# Patient Record
Sex: FEMALE | Race: OTHER | HISPANIC OR LATINO | Employment: STUDENT | URBAN - METROPOLITAN AREA
[De-identification: names, ages, dates, MRNs, and addresses within clinical notes are randomized per-mention and may not be internally consistent; named-entity substitution may affect disease eponyms.]

---
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
## 2024-03-01 ENCOUNTER — APPOINTMENT (OUTPATIENT)
Dept: URGENT CARE | Facility: CLINIC | Age: 19
End: 2024-03-01
Payer: OTHER MISCELLANEOUS

## 2024-03-01 PROCEDURE — G0382 LEV 3 HOSP TYPE B ED VISIT: HCPCS

## 2024-03-01 PROCEDURE — 99283 EMERGENCY DEPT VISIT LOW MDM: CPT

## 2024-03-04 ENCOUNTER — APPOINTMENT (OUTPATIENT)
Dept: URGENT CARE | Facility: CLINIC | Age: 19
End: 2024-03-04
Payer: OTHER MISCELLANEOUS

## 2024-03-04 PROCEDURE — 99213 OFFICE O/P EST LOW 20 MIN: CPT

## 2024-07-30 ENCOUNTER — OFFICE VISIT (OUTPATIENT)
Dept: URGENT CARE | Facility: CLINIC | Age: 19
End: 2024-07-30
Payer: COMMERCIAL

## 2024-07-30 VITALS
WEIGHT: 138.2 LBS | TEMPERATURE: 98.8 F | RESPIRATION RATE: 18 BRPM | DIASTOLIC BLOOD PRESSURE: 64 MMHG | BODY MASS INDEX: 25.43 KG/M2 | SYSTOLIC BLOOD PRESSURE: 108 MMHG | HEART RATE: 65 BPM | OXYGEN SATURATION: 97 % | HEIGHT: 62 IN

## 2024-07-30 DIAGNOSIS — M54.50 ACUTE LEFT-SIDED LOW BACK PAIN WITHOUT SCIATICA: Primary | ICD-10-CM

## 2024-07-30 PROCEDURE — 99213 OFFICE O/P EST LOW 20 MIN: CPT | Performed by: NURSE PRACTITIONER

## 2024-07-30 RX ORDER — BACLOFEN 10 MG/1
10 TABLET ORAL 3 TIMES DAILY PRN
Qty: 30 TABLET | Refills: 0 | Status: SHIPPED | OUTPATIENT
Start: 2024-07-30

## 2024-07-30 RX ORDER — ESCITALOPRAM OXALATE 10 MG/1
10 TABLET ORAL DAILY
COMMUNITY
Start: 2024-06-14

## 2024-07-30 RX ORDER — IBUPROFEN 400 MG/1
400 TABLET ORAL 3 TIMES DAILY PRN
Qty: 60 TABLET | Refills: 0 | Status: SHIPPED | OUTPATIENT
Start: 2024-07-30

## 2024-07-30 NOTE — PROGRESS NOTES
[facility]        NAME: Mara Ruiz is a 18 y.o. female  : 2005    MRN: [id number]  DATE: 2024  TIME: 4:36 PM    Assessment and Plan   Acute left-sided low back pain without sciatica [M54.50]  1. Acute left-sided low back pain without sciatica  ibuprofen (MOTRIN) 400 mg tablet    baclofen 10 mg tablet            Patient Instructions     Take meds as prescribed  Muscle relaxant may cause drowsiness   Follow up with PCP in 3-5 days.  Proceed to  ER if symptoms worsen.    If tests have been performed at [facility], our office will contact you with results if changes need to be made to the care plan discussed with you at the visit.  [facility].    Chief Complaint     Chief Complaint   Patient presents with    Back Pain     Left side lower back pain that started 2-3 days ago, worse over past 24 hours, denies specific injury         History of Present Illness       HPI  Reports she was working out at the gym when she started having back pain. This was 2-3 days ago. Has gotten worse over the last 24 hrs. No sure what caused it to get worse. Has not used any meds at home. Hx of scoliosis.       Review of Systems   Review of Systems   Constitutional:  Negative for fever.   Respiratory:  Negative for shortness of breath.    Cardiovascular:  Negative for chest pain.   Gastrointestinal:  Negative for nausea and vomiting.   Musculoskeletal:  Positive for back pain (developed over time, not suddenly). Negative for neck pain.   Skin:  Negative for rash.   Neurological:  Negative for headaches.         Current Medications       Current Outpatient Medications:     baclofen 10 mg tablet, Take 1 tablet (10 mg total) by mouth 3 (three) times a day as needed for muscle spasms, Disp: 30 tablet, Rfl: 0    escitalopram (LEXAPRO) 10 mg tablet, Take 10 mg by mouth daily, Disp: , Rfl:     ibuprofen (MOTRIN) 400 mg tablet, Take 1 tablet (400 mg total) by mouth 3 (three)  "times a day as needed for mild pain or moderate pain (back pain), Disp: 60 tablet, Rfl: 0    Current Allergies     Allergies as of 07/30/2024    (No Known Allergies)            The following portions of the patient's history were reviewed and updated as appropriate: allergies, current medications, past family history, past medical history, past social history, past surgical history and problem list.     Past Medical History:   Diagnosis Date    No known health problems        Past Surgical History:   Procedure Laterality Date    NO PAST SURGERIES         History reviewed. No pertinent family history.      Medications have been verified.        Objective   /64 (BP Location: Right arm, Patient Position: Sitting)   Pulse 65   Temp 98.8 °F (37.1 °C) (Tympanic)   Resp 18   Ht 5' 2\" (1.575 m)   Wt 62.7 kg (138 lb 3.2 oz)   LMP  (Within Days)   SpO2 97%   BMI 25.28 kg/m²   No LMP recorded (within days).       Physical Exam     Physical Exam  Cardiovascular:      Rate and Rhythm: Regular rhythm.      Heart sounds: Normal heart sounds.   Pulmonary:      Effort: Pulmonary effort is normal.      Breath sounds: Normal breath sounds.   Musculoskeletal:         General: Tenderness (with palpation of the left side of the lower back. No pain on the left lumbar spine) present. No swelling or deformity.   Skin:     Findings: No bruising.   Neurological:      Gait: Gait normal.                   "

## 2024-07-30 NOTE — LETTER
July 30, 2024     Patient: Mara Ruiz   YOB: 2005   Date of Visit: 7/30/2024       To Whom it May Concern:    Mara Ruiz was seen in my clinic on 7/30/2024. She may return to work on 08/01/2024 .    If you have any questions or concerns, please don't hesitate to call.         Sincerely,          BE SANTOS PAGE        CC: No Recipients

## 2025-01-10 ENCOUNTER — OFFICE VISIT (OUTPATIENT)
Dept: URGENT CARE | Facility: CLINIC | Age: 20
End: 2025-01-10
Payer: COMMERCIAL

## 2025-01-10 VITALS
HEART RATE: 75 BPM | TEMPERATURE: 98.6 F | OXYGEN SATURATION: 98 % | RESPIRATION RATE: 18 BRPM | BODY MASS INDEX: 23.41 KG/M2 | WEIGHT: 128 LBS | DIASTOLIC BLOOD PRESSURE: 66 MMHG | SYSTOLIC BLOOD PRESSURE: 106 MMHG

## 2025-01-10 DIAGNOSIS — K52.9 NONINFECTIOUS GASTROENTERITIS, UNSPECIFIED TYPE: Primary | ICD-10-CM

## 2025-01-10 PROCEDURE — 99213 OFFICE O/P EST LOW 20 MIN: CPT | Performed by: NURSE PRACTITIONER

## 2025-01-10 NOTE — PROGRESS NOTES
[facility]        NAME: Mara Ruiz is a 19 y.o. female  : 2005    MRN: [id number]  DATE: January 10, 2025  TIME: 2:23 PM    Assessment and Plan   Noninfectious gastroenteritis, unspecified type [K52.9]  1. Noninfectious gastroenteritis, unspecified type              Patient Instructions       Cont taking peptobismuth OTC prn  Advised if employer needs her to fill-out a FMLA document, she should go to PCP  Verbalized understanding   Follow up with PCP in 3-5 days.  Proceed to  ER if symptoms worsen.    If tests have been performed at [facility], our office will contact you with results if changes need to be made to the care plan discussed with you at the visit.  [facility]    Chief Complaint     Chief Complaint   Patient presents with    Abdominal Pain     Started Wednesday night with abdominal pain, vomiting and diarrhea, denies fever, reports normal urine output         History of Present Illness       HPI  Reports abdominal pain that started 2 days ago. Also had vomiting and diarrhea. Getting better. Last vomiting was yesterday. Had a bit of diarrhea, soft stool (improved). Took some peptobismuth OTC. Requesting an excuse from work. Says she was out of work the previous two days d/t same medical condition.     Review of Systems   Review of Systems   Constitutional:  Negative for fatigue and fever.   HENT:  Negative for trouble swallowing.    Respiratory:  Negative for cough, chest tightness, shortness of breath and wheezing.    Cardiovascular:  Negative for chest pain.   Gastrointestinal:  Positive for abdominal pain, diarrhea and vomiting. Negative for blood in stool and nausea.   Neurological:  Negative for light-headedness.         Current Medications       Current Outpatient Medications:     baclofen 10 mg tablet, Take 1 tablet (10 mg total) by mouth 3 (three) times a day as needed for muscle spasms, Disp: 30 tablet, Rfl: 0    escitalopram  (LEXAPRO) 10 mg tablet, Take 10 mg by mouth daily, Disp: , Rfl:     ibuprofen (MOTRIN) 400 mg tablet, Take 1 tablet (400 mg total) by mouth 3 (three) times a day as needed for mild pain or moderate pain (back pain), Disp: 60 tablet, Rfl: 0    Current Allergies     Allergies as of 01/10/2025    (No Known Allergies)            The following portions of the patient's history were reviewed and updated as appropriate: allergies, current medications, past family history, past medical history, past social history, past surgical history and problem list.     Past Medical History:   Diagnosis Date    No known health problems        Past Surgical History:   Procedure Laterality Date    NO PAST SURGERIES         History reviewed. No pertinent family history.      Medications have been verified.        Objective   /66 (BP Location: Right arm, Patient Position: Sitting)   Pulse 75   Temp 98.6 °F (37 °C) (Tympanic)   Resp 18   Wt 58.1 kg (128 lb)   LMP 12/25/2024 (Within Days)   SpO2 98%   BMI 23.41 kg/m²   Patient's last menstrual period was 12/25/2024 (within days).       Physical Exam     Physical Exam  HENT:      Right Ear: Tympanic membrane normal.      Left Ear: Tympanic membrane normal.      Nose: No rhinorrhea.      Mouth/Throat:      Pharynx: No posterior oropharyngeal erythema.   Cardiovascular:      Rate and Rhythm: Regular rhythm.      Heart sounds: Normal heart sounds.   Pulmonary:      Effort: Pulmonary effort is normal.      Breath sounds: Normal breath sounds.   Abdominal:      General: Bowel sounds are normal.      Palpations: Abdomen is soft.      Tenderness: There is no abdominal tenderness. There is no right CVA tenderness, left CVA tenderness, guarding or rebound.

## 2025-01-10 NOTE — LETTER
January 10, 2025     Patient: Mara Ruiz   YOB: 2005   Date of Visit: 1/10/2025       To Whom it May Concern:    Mara Ruiz was seen in my clinic on 1/10/2025. She may return to work on 01/11/2024 . She states she was out of work on 01/08/2025 and 01/09/2025 for same medical reason.     If you have any questions or concerns, please don't hesitate to call.         Sincerely,          ZEB Knowles        CC: No Recipients